# Patient Record
Sex: FEMALE | Race: WHITE | NOT HISPANIC OR LATINO | URBAN - METROPOLITAN AREA
[De-identification: names, ages, dates, MRNs, and addresses within clinical notes are randomized per-mention and may not be internally consistent; named-entity substitution may affect disease eponyms.]

---

## 2022-03-17 ENCOUNTER — OFFICE VISIT (OUTPATIENT)
Dept: GASTROENTEROLOGY | Facility: CLINIC | Age: 87
End: 2022-03-17
Payer: MEDICARE

## 2022-03-17 ENCOUNTER — TELEPHONE (OUTPATIENT)
Dept: GASTROENTEROLOGY | Facility: CLINIC | Age: 87
End: 2022-03-17

## 2022-03-17 VITALS
SYSTOLIC BLOOD PRESSURE: 128 MMHG | HEIGHT: 60 IN | BODY MASS INDEX: 19.24 KG/M2 | DIASTOLIC BLOOD PRESSURE: 82 MMHG | HEART RATE: 75 BPM | WEIGHT: 98 LBS

## 2022-03-17 DIAGNOSIS — R13.19 ESOPHAGEAL DYSPHAGIA: Primary | ICD-10-CM

## 2022-03-17 PROBLEM — K21.9 GASTROESOPHAGEAL REFLUX DISEASE: Status: ACTIVE | Noted: 2022-03-17

## 2022-03-17 PROCEDURE — 99204 OFFICE O/P NEW MOD 45 MIN: CPT | Performed by: INTERNAL MEDICINE

## 2022-03-17 NOTE — TELEPHONE ENCOUNTER
Scheduled date of EGD(as of today):05/05/2022   Physician performing EGD:Dr BAXTER [facility]  [facility]  Instructions reviewed with patient by: Aki Tilley  Clearances: No

## 2022-03-17 NOTE — PROGRESS NOTES
"Pt presents with flat affect and appears depressed. Pt asked to speak with staff because she is experiencing SI thoughts with urges to \"swallow a bottle of pills\". Pt states that these urges scare her because she has never experienced them before. Pt endorses depression and anxiety. Pt requested hydroxyzine and was given 50 mg hydroxyzine with morning medications. Pt was also given 0.5 mg of Ativan to address anxiety surrounding SI urges at 10:45. Pt denies other mental health symptoms such as hallucinations and paranoia.     Pt also complained of \"pressure in my brain\". Pt states there is not so much pain as there is pressure as if her \"ears are plugged up\". Pt was given 650 mg of Tylenol. Pt vital signs are within normal range and pt had no other complaints of pain.       " [facility] - Outpatient Consultation  Eleanor Goodpasture 81 y o  female MRN: [id number]  Encounter: [id number]    ASSESSMENT AND PLAN:      1  Esophageal dysphagia  86F remarkably healthy here today with her son as a new pt at the request of Dr FOY [facility] for worsening pill dysphagia for the past 4 months  Pt on no Rx meds, but was taking various digestive enzymes/multivitamins  No obvious reflux symptoms, but given the worsening dysphagia, will need to rule out reflux induced esophagitis, stricture, malignancy, hiatal hernia  Pt is hesitant to do any procedures, anesthesia, medications  I explained to her and the son the risks assoc with EGD/anesthesia  After the procedure, we can decide if we want to proceed with any PPI therapy or do nothing at all     - Schedule EGD @ 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  (aka, no limitation on activity as tolerated)    2  belching    Followup Appointment: 3 months  ______________________________________________________________________    Chief Complaint   Patient presents with    GERD, gas after eating       HPI:   Eleanor Goodpasture is a 81y o  year old female who presents today at the request of Dr Ce Herrera MD for dysphagia  Pt is here with her son today  Pt has no sig pmhx, shx, on no chronic meds, never been hospitalized, no cad/cva, still is active 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, 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, [employer] dancing, gardening  She lives alone in her home  She states for the past 4 months, she's had about 3 times a week, episodes where she feels a lot of gas trapped in the epigastric region, improved with belching  However, this is often assoc with sig dysphagia - characterized by solid food or jaclyn her multivitamins feeling like they are hung up in the epigastric region   Sometimes these episodes can last up to 30 minutes  Denies heartburn, regurgitation, n/v/abd pains  No odynophagia  Lost about 2lbs mainly bc she is careful what she eats after these episodes  Normal bowels  No GIB  Historical Information   Past Medical History:   Diagnosis Date    GERD (gastroesophageal reflux disease)      History reviewed  No pertinent surgical history  Social History     Substance and Sexual Activity   Alcohol Use Not Currently     Social History     Substance and Sexual Activity   Drug Use Not on file     Social History     Tobacco Use   Smoking Status Never Smoker   Smokeless Tobacco Never Used     Family History   Problem Relation Age of Onset    Colon polyps Neg Hx     Colon cancer Neg Hx        Meds/Allergies   No current outpatient medications on file  Allergies   Allergen Reactions    Aspirin Other (See Comments)     Unknown    Sulfa Antibiotics Other (See Comments)     Unknown         PHYSICAL EXAM:    Blood pressure 128/82, pulse 75, height 5' (1 524 m), weight 44 5 kg (98 lb)  Body mass index is 19 14 kg/m²  General Appearance: NAD, cooperative, alert  Eyes: Anicteric, PERRLA, EOMI  ENT:  Normocephalic, atraumatic, normal mucosa  Neck:  Supple, symmetrical, trachea midline,   Resp:  Clear to auscultation bilaterally; no rales, rhonchi or wheezing; respirations unlabored   CV:  S1 S2, Regular rate and rhythm; no murmur, rub, or gallop  GI:  Soft, non-tender, non-distended; normal bowel sounds; no masses, no organomegaly   Rectal: Deferred  Musculoskeletal: No cyanosis, clubbing or edema  Normal ROM    Skin:  No jaundice, rashes, or lesions   Heme/Lymph: No palpable cervical lymphadenopathy  Psych: Normal affect, good eye contact  Neuro: No gross deficits, AAOx3    Lab Results:   No results found for: WBC, HGB, HCT, MCV, PLT  No results found for: NA, K, CL, CO2, ANIONGAP, BUN, CREATININE, GLUCOSE, GLUF, CALCIUM, CORRECTEDCA, AST, ALT, ALKPHOS, PROT, BILITOT, EGFR  No results found for: IRON, TIBC, FERRITIN  No results found for: LIPASE    Radiology Results:   No results found  REVIEW OF SYSTEMS:    CONSTITUTIONAL: Denies any fever, chills, rigors, and weight loss  HEENT: No earache or tinnitus  Denies hearing loss or visual disturbances  CARDIOVASCULAR: No chest pain or palpitations  RESPIRATORY: Denies any cough, hemoptysis, shortness of breath or dyspnea on exertion  GASTROINTESTINAL: As noted in the History of Present Illness  GENITOURINARY: No problems with urination  Denies any hematuria or dysuria  NEUROLOGIC: No dizziness or vertigo, denies headaches  MUSCULOSKELETAL: Denies any muscle or joint pain  SKIN: Denies skin rashes or itching  ENDOCRINE: Denies excessive thirst  Denies intolerance to heat or cold  PSYCHOSOCIAL: Denies depression or anxiety  Denies any recent memory loss

## 2022-04-22 ENCOUNTER — HOSPITAL ENCOUNTER (OUTPATIENT)
Dept: GASTROENTEROLOGY | Facility: AMBULATORY SURGERY CENTER | Age: 87
Discharge: HOME | End: 2022-04-22
Attending: INTERNAL MEDICINE
Payer: MEDICARE

## 2022-04-22 ENCOUNTER — ANESTHESIA EVENT (OUTPATIENT)
Dept: GASTROENTEROLOGY | Facility: AMBULATORY SURGERY CENTER | Age: 87
End: 2022-04-22

## 2022-04-22 ENCOUNTER — ANESTHESIA (OUTPATIENT)
Dept: GASTROENTEROLOGY | Facility: AMBULATORY SURGERY CENTER | Age: 87
End: 2022-04-22

## 2022-04-22 VITALS
RESPIRATION RATE: 18 BRPM | OXYGEN SATURATION: 97 % | DIASTOLIC BLOOD PRESSURE: 75 MMHG | SYSTOLIC BLOOD PRESSURE: 153 MMHG | TEMPERATURE: 98.5 F | HEART RATE: 73 BPM

## 2022-04-22 DIAGNOSIS — R13.19 ESOPHAGEAL DYSPHAGIA: ICD-10-CM

## 2022-04-22 PROCEDURE — 43235 EGD DIAGNOSTIC BRUSH WASH: CPT | Performed by: INTERNAL MEDICINE

## 2022-04-22 PROCEDURE — 43450 DILATE ESOPHAGUS 1/MULT PASS: CPT | Performed by: INTERNAL MEDICINE

## 2022-04-22 RX ORDER — PROPOFOL 10 MG/ML
INJECTION, EMULSION INTRAVENOUS AS NEEDED
Status: DISCONTINUED | OUTPATIENT
Start: 2022-04-22 | End: 2022-04-22

## 2022-04-22 RX ORDER — SODIUM CHLORIDE, SODIUM LACTATE, POTASSIUM CHLORIDE, CALCIUM CHLORIDE 600; 310; 30; 20 MG/100ML; MG/100ML; MG/100ML; MG/100ML
50 INJECTION, SOLUTION INTRAVENOUS CONTINUOUS
Status: DISCONTINUED | OUTPATIENT
Start: 2022-04-22 | End: 2022-04-26 | Stop reason: HOSPADM

## 2022-04-22 RX ORDER — LIDOCAINE HYDROCHLORIDE 10 MG/ML
INJECTION, SOLUTION EPIDURAL; INFILTRATION; INTRACAUDAL; PERINEURAL AS NEEDED
Status: DISCONTINUED | OUTPATIENT
Start: 2022-04-22 | End: 2022-04-22

## 2022-04-22 RX ADMIN — LIDOCAINE HYDROCHLORIDE 50 MG: 10 INJECTION, SOLUTION EPIDURAL; INFILTRATION; INTRACAUDAL; PERINEURAL at 08:43

## 2022-04-22 RX ADMIN — PROPOFOL 60 MG: 10 INJECTION, EMULSION INTRAVENOUS at 08:43

## 2022-04-22 RX ADMIN — PROPOFOL 40 MG: 10 INJECTION, EMULSION INTRAVENOUS at 08:48

## 2022-04-22 RX ADMIN — SODIUM CHLORIDE, SODIUM LACTATE, POTASSIUM CHLORIDE, CALCIUM CHLORIDE 50 ML/HR: 600; 310; 30; 20 INJECTION, SOLUTION INTRAVENOUS at 08:22

## 2022-04-22 NOTE — H&P
History and Physical - [facility]    Sulaiman Alanis 81 y o  female MRN: [id number]      HPI: Sulaiman Alanis is a 81y o  year old female who presents for dysphagia  REVIEW OF SYSTEMS: Per the HPI, and otherwise unremarkable  Historical Information     Past Medical History:   Diagnosis Date    GERD (gastroesophageal reflux disease)      History reviewed  No pertinent surgical history  Social History   Social History     Substance and Sexual Activity   Alcohol Use Not Currently     Social History     Substance and Sexual Activity   Drug Use Never     Social History     Tobacco Use   Smoking Status Never Smoker   Smokeless Tobacco Never Used     Family History   Problem Relation Age of Onset    Colon polyps Neg Hx     Colon cancer Neg Hx        Meds/Allergies     No current outpatient medications on file  Current Facility-Administered Medications:     lactated ringers infusion, 50 mL/hr, Intravenous, Continuous, 50 mL/hr at 04/22/22 1613    Allergies   Allergen Reactions    Aspirin Other (See Comments)     Unknown    Sulfa Antibiotics Other (See Comments)     Unknown         Objective     BP (!) 179/89   Pulse 81   Temp 98 5 °F (36 9 °C) (Temporal)   Resp 20   SpO2 99%       PHYSICAL EXAM    Gen: NAD AAOx3  Head: Normocephalic, Atraumatic  CV: S1S2 RRR no m/r/g  CHEST: Clear b/l no c/r/w  ABD: soft, +BS NT/ND no masses  EXT: no edema      ASSESSMENT/PLAN:  This is a 81y o  year old female here for EGD/dilation, and she is stable and optimized for her procedure

## 2022-04-22 NOTE — ANESTHESIA POSTPROCEDURE EVALUATION
Post-Op Assessment Note    CV Status:  Stable  Pain Score: 0    Pain management: adequate     Mental Status:  Sleepy   Hydration Status:  Euvolemic and stable   PONV Controlled:  None   Airway Patency:  Patent      Post Op Vitals Reviewed: Yes      Staff: CRNA         No complications documented      /61 (04/22/22 0852)    Temp      Pulse 76 (04/22/22 0852)   Resp 18 (04/22/22 0852)    SpO2 99 % (04/22/22 0852)

## 2022-04-22 NOTE — ANESTHESIA PREPROCEDURE EVALUATION
Procedure:  EGD    Relevant Problems   GI/HEPATIC   (+) Esophageal dysphagia   (+) Gastroesophageal reflux disease        Physical Exam    Airway    Mallampati score: II  TM Distance: >3 FB  Neck ROM: limited     Dental   upper dentures,     Cardiovascular      Pulmonary      Other Findings        Anesthesia Plan  ASA Score- 2     Anesthesia Type- IV sedation with anesthesia with ASA Monitors  Additional Monitors:   Airway Plan:           Plan Factors-    Chart reviewed  Patient summary reviewed  Patient is not a current smoker  Induction- intravenous  Postoperative Plan-     Informed Consent- Anesthetic plan and risks discussed with patient  I personally reviewed this patient with the CRNA  Discussed and agreed on the Anesthesia Plan with the CRNA  Ashia Reyez

## 2022-06-20 ENCOUNTER — APPOINTMENT (OUTPATIENT)
Dept: RADIOLOGY | Facility: CLINIC | Age: 87
End: 2022-06-20
Payer: MEDICARE

## 2022-06-20 ENCOUNTER — OFFICE VISIT (OUTPATIENT)
Dept: URGENT CARE | Facility: CLINIC | Age: 87
End: 2022-06-20
Payer: MEDICARE

## 2022-06-20 VITALS
HEART RATE: 86 BPM | TEMPERATURE: 97.7 F | RESPIRATION RATE: 16 BRPM | WEIGHT: 100 LBS | BODY MASS INDEX: 19.63 KG/M2 | HEIGHT: 60 IN | DIASTOLIC BLOOD PRESSURE: 70 MMHG | OXYGEN SATURATION: 98 % | SYSTOLIC BLOOD PRESSURE: 110 MMHG

## 2022-06-20 DIAGNOSIS — S49.91XA INJURY OF RIGHT UPPER EXTREMITY, INITIAL ENCOUNTER: ICD-10-CM

## 2022-06-20 DIAGNOSIS — S42.211A CLOSED DISPLACED FRACTURE OF SURGICAL NECK OF RIGHT HUMERUS, UNSPECIFIED FRACTURE MORPHOLOGY, INITIAL ENCOUNTER: Primary | ICD-10-CM

## 2022-06-20 PROCEDURE — 73060 X-RAY EXAM OF HUMERUS: CPT

## 2022-06-20 PROCEDURE — G0463 HOSPITAL OUTPT CLINIC VISIT: HCPCS | Performed by: PHYSICIAN ASSISTANT

## 2022-06-20 PROCEDURE — 73090 X-RAY EXAM OF FOREARM: CPT

## 2022-06-20 PROCEDURE — 99213 OFFICE O/P EST LOW 20 MIN: CPT | Performed by: PHYSICIAN ASSISTANT

## 2022-06-20 NOTE — PATIENT INSTRUCTIONS
Proximal Humerus Fracture   AMBULATORY CARE:   A proximal humerus fracture  is a crack or break in the top of your upper arm bone  The proximal humerus is one of the bones in your shoulder joint  Common signs and symptoms include the following:   Pain when you move your arm    Swelling and bruising    Trouble moving your shoulder    Abnormal arm position or shape    Weakness or numbness in your arm, hand, or fingers    Seek care immediately if:   Your pain does not get better or gets worse, even after you rest and take medicine  Your arm, hand, or fingers feel numb  The skin over your fracture is swollen, cold, or pale  You cannot move your arm, hand, or fingers  Call your doctor or orthopedist if:   You have a fever  Your sling gets wet, damaged, or falls off  You have questions or concerns about your condition or care  Treatment  depends on the type of fracture you have  You may need any of the following:  A sling  may be needed to hold your broken bones in place  It will decrease your arm movement and allow the bones to heal          Prescription pain medicine  may be given  Ask your healthcare provider how to take this medicine safely  Some prescription pain medicines contain acetaminophen  Do not take other medicines that contain acetaminophen without talking to your healthcare provider  Too much acetaminophen may cause liver damage  Prescription pain medicine may cause constipation  Ask your healthcare provider how to prevent or treat constipation  NSAIDs , such as ibuprofen, help decrease swelling, pain, and fever  This medicine is available with or without a doctor's order  NSAIDs can cause stomach bleeding or kidney problems in certain people  If you take blood thinner medicine, always ask your healthcare provider if NSAIDs are safe for you  Always read the medicine label and follow directions  Acetaminophen  decreases pain and fever   It is available without a doctor's order  Ask how much to take and how often to take it  Follow directions  Read the labels of all other medicines you are using to see if they also contain acetaminophen, or ask your doctor or pharmacist  Acetaminophen can cause liver damage if not taken correctly  Do not use more than 4 grams (4,000 milligrams) total of acetaminophen in one day  Closed reduction  may be done to put your bones back into the correct position without surgery  Surgery  may be needed to put your bones back into the correct position  This is called open reduction  An incision is made and the bones are put back in the correct position  Wires, pins, plates, or screws may be used to hold the bones in place  For severe fractures, surgery to replace part of the shoulder joint with an artificial implant may be needed  Manage your symptoms:   Rest  your arm as much as possible  Ask your healthcare provider when you can move your arm  Also ask when you can return to sports or vigorous exercises  Apply ice  on your arm for 15 to 20 minutes every hour or as directed  Use an ice pack, or put crushed ice in a plastic bag  Cover it with a towel before you put it on your arm  Ice helps prevent tissue damage and decreases swelling and pain  Go to physical therapy as directed  A physical therapist teaches you exercises to help improve movement and strength, and to decrease pain  Follow up with your doctor or orthopedist as directed:  Write down your questions so you remember to ask them during your visits  © Copyright [employer] 2022 Information is for End User's use only and may not be sold, redistributed or otherwise used for commercial purposes  All illustrations and images included in CareNotes® are the copyrighted property of A D A M , Inc  or [employer] Gar[employer]   The above information is an  only  It is not intended as medical advice for individual conditions or treatments   Talk to your doctor, nurse or pharmacist before following any medical regimen to see if it is safe and effective for you

## 2022-06-30 NOTE — PROGRESS NOTES
[facility]        NAME: Ayleen Chin is a 81 y o  female  : 1935    MRN: [id number]  DATE: 2022  TIME: 7:21 AM    Assessment and Plan   Closed displaced fracture of surgical neck of right humerus, unspecified fracture morphology, initial encounter [S42 [id number]]  1  Closed displaced fracture of surgical neck of right humerus, unspecified fracture morphology, initial encounter     2  Injury of right upper extremity, initial encounter  XR humerus right    XR forearm 2 vw right         Patient Instructions       Follow up with PCP in 3-5 days  Proceed to  ER if symptoms worsen  Chief Complaint     Chief Complaint   Patient presents with    Arm Pain     Right arm pain resulting from a fall three days ago  Pt reports pain and swelling with limited movement  History of Present Illness       67-year-old female presents the clinic with right upper arm pain that started 3 days ago  Patient states she fell 3 days ago but did not reported to her family until today  Patient notes that she has pain and swelling with limited movement of her upper extremity  Patient's granddaughter states that patient has surgery scheduled with [facility] at the end of July for her right shoulder due to arthritic changes and pain  Review of Systems   Review of Systems   Constitutional: Negative for chills and fever  Musculoskeletal: Positive for arthralgias, joint swelling and myalgias  Skin: Negative for color change, rash and wound  Neurological: Negative for weakness and numbness  Current Medications     No current outpatient medications on file      Current Allergies     Allergies as of 2022 - Reviewed 2022   Allergen Reaction Noted    Aspirin Other (See Comments) 2022    Sulfa antibiotics Other (See Comments) 2022            The following portions of the patient's history were reviewed and updated as appropriate: allergies, current medications, past family history, past medical history, past social history, past surgical history and problem list      Past Medical History:   Diagnosis Date    GERD (gastroesophageal reflux disease)        History reviewed  No pertinent surgical history  Family History   Problem Relation Age of Onset    Colon polyps Neg Hx     Colon cancer Neg Hx          Medications have been verified  Objective   /70   Pulse 86   Temp 97 7 °F (36 5 °C)   Resp 16   Ht 5' (1 524 m)   Wt 45 4 kg (100 lb)   SpO2 98%   BMI 19 53 kg/m²   No LMP recorded  Physical Exam     Physical Exam  Vitals and nursing note reviewed  Constitutional:       General: She is not in acute distress  Appearance: She is well-developed  She is not diaphoretic  HENT:      Head: Normocephalic and atraumatic  Pulmonary:      Effort: Pulmonary effort is normal    Musculoskeletal:      Right shoulder: Swelling, tenderness and bony tenderness present  Decreased range of motion  Normal pulse  Comments: Right shoulder x-ray:  Fracture to the humeral neck noted  TTP and limited AROM noted on exam   Patient placed in sling and granddaughter will be calling Orthopedics today to get an appointment with Rashad Greenwood for patient  Skin:     General: Skin is warm and dry  Capillary Refill: Capillary refill takes less than 2 seconds  Neurological:      Mental Status: She is alert and oriented to person, place, and time

## 2022-07-10 ENCOUNTER — TELEPHONE (OUTPATIENT)
Dept: OTHER | Facility: OTHER | Age: 87
End: 2022-07-10

## 2022-07-10 NOTE — TELEPHONE ENCOUNTER
Patient daughter in-law called and canceled her mother in-law appointment because she is not feeling well, patient daughter in-law stated that she will call back the office to reschedule her mother in-law appointment